# Patient Record
(demographics unavailable — no encounter records)

---

## 2025-07-23 NOTE — HISTORY OF PRESENT ILLNESS
[FreeTextEntry1] : new patient cpe [de-identified] : 44 y/o male with pmhx of htn, gallstones, pancreatic pseudocyst now s/o drainage, prediabetes, history of pancreatitis presents for new patient cpe. Patient has been doing well. He will be going for gallbladder stent removal soon

## 2025-07-23 NOTE — HEALTH RISK ASSESSMENT
[No] : No [Yes] : In the past 12 months have you used drugs other than those required for medical reasons? 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

## 2025-07-23 NOTE — ASSESSMENT
[Vaccines Reviewed] : Immunizations reviewed today. Please see immunization details in the vaccine log within the immunization flowsheet.  [FreeTextEntry1] : CPE: -will order routine lab work -medical history including surgical history, family history, and current medications reviewed and documented as above -Age appropriate screenings including but not limited to cancer screening, alcohol misuse (audit-c) and depression screening as documented above -patient counseled on healthy diet and lifestyle changes including increasing physical activity and eating a diet low in processed foods and high in fruits and vegetables -counseled patient on age appropriate vaccinations and immunization record updated  HTN bp stable, continue metoprolol succinate 100mg daily and losartan 50mg daily